# Patient Record
Sex: MALE | URBAN - METROPOLITAN AREA
[De-identification: names, ages, dates, MRNs, and addresses within clinical notes are randomized per-mention and may not be internally consistent; named-entity substitution may affect disease eponyms.]

---

## 2022-09-19 ENCOUNTER — OFFICE VISIT (OUTPATIENT)
Dept: URBAN - METROPOLITAN AREA CLINIC 124 | Facility: CLINIC | Age: 27
End: 2022-09-19

## 2022-09-19 NOTE — HPI-TODAY'S VISIT:
The patient was referred by Dr. Jin Giles for eval of .   A copy of this document is being forwarded to the referring provider.

## 2024-06-05 ENCOUNTER — OFFICE VISIT (OUTPATIENT)
Dept: URBAN - METROPOLITAN AREA CLINIC 105 | Facility: CLINIC | Age: 29
End: 2024-06-05
Payer: COMMERCIAL

## 2024-06-05 VITALS
BODY MASS INDEX: 22.94 KG/M2 | WEIGHT: 160.2 LBS | HEART RATE: 70 BPM | TEMPERATURE: 96.6 F | DIASTOLIC BLOOD PRESSURE: 83 MMHG | HEIGHT: 70 IN | SYSTOLIC BLOOD PRESSURE: 132 MMHG

## 2024-06-05 DIAGNOSIS — F90.9 ADULT ADHD: ICD-10-CM

## 2024-06-05 DIAGNOSIS — F41.9 ANXIETY DISORDER, UNSPECIFIED TYPE: ICD-10-CM

## 2024-06-05 DIAGNOSIS — R14.0 ABDOMINAL BLOATING: ICD-10-CM

## 2024-06-05 DIAGNOSIS — R53.83 FATIGUE, UNSPECIFIED TYPE: ICD-10-CM

## 2024-06-05 DIAGNOSIS — T78.1XXA GASTROINTESTINAL FOOD SENSITIVITY: ICD-10-CM

## 2024-06-05 DIAGNOSIS — R19.7 DIARRHEA, UNSPECIFIED TYPE: ICD-10-CM

## 2024-06-05 DIAGNOSIS — Z86.69 HX OF MIGRAINES: ICD-10-CM

## 2024-06-05 DIAGNOSIS — R11.0 NAUSEA: ICD-10-CM

## 2024-06-05 DIAGNOSIS — Z87.19 HISTORY OF IRRITABLE BOWEL SYNDROME: ICD-10-CM

## 2024-06-05 DIAGNOSIS — K21.9 GASTROESOPHAGEAL REFLUX DISEASE, UNSPECIFIED WHETHER ESOPHAGITIS PRESENT: ICD-10-CM

## 2024-06-05 DIAGNOSIS — Z86.19 HISTORY OF CLOSTRIDIOIDES DIFFICILE COLITIS: ICD-10-CM

## 2024-06-05 DIAGNOSIS — K90.49 ALCOHOL INTOLERANCE: ICD-10-CM

## 2024-06-05 PROCEDURE — 99205 OFFICE O/P NEW HI 60 MIN: CPT | Performed by: INTERNAL MEDICINE

## 2024-06-05 RX ORDER — SUCRALFATE 1 G/10ML
10 ML 1 HOUR BEFORE MEALS AND AT BEDTIME ON AN EMPTY STOMACH SUSPENSION ORAL
Status: ACTIVE | COMMUNITY

## 2024-06-05 RX ORDER — FINASTERIDE 1 MG/1
1 TABLET TABLET, FILM COATED ORAL ONCE A DAY
Status: ACTIVE | COMMUNITY

## 2024-06-05 RX ORDER — PANTOPRAZOLE SODIUM 20 MG/1
1 TABLET TABLET, DELAYED RELEASE ORAL ONCE A DAY
Status: ACTIVE | COMMUNITY

## 2024-06-05 RX ORDER — CITALOPRAM 30 MG/1
1 CAPSULE CAPSULE ORAL ONCE A DAY
Status: ACTIVE | COMMUNITY

## 2024-06-05 RX ORDER — BACLOFEN 20 MG/1
1 TABLET ADMINISTER WITHOUT REGARDS TO MEALS AS NEEDED TABLET ORAL TWICE A DAY
Status: ACTIVE | COMMUNITY

## 2024-06-05 NOTE — HPI-TODAY'S VISIT:
Today, the patient says he was unhappy with his last GI, so he was seeking another opinion. He says he's been having "leaky gut" for a little under 2 years - almost daily watery diarrhea and mucus. He has 1-3 BMs/day with incomplete evac. He has a formed BM 1x/couple weeks. His watery BMs can start out with formed pieces then turn completely water. No blood or abdominal pain. He had C. diff in 2016. He has a lot of bloating and gas. He will sometimes feel bloated, go to have a BM then only mucus comes out. He also has migraines - now associated with dietary triggers, but prev there was no pattern with certain foods. Other symptoms are low blood pressure, "watery bloating," and fatigue. He says his number one symptom is fatigue. He only has nausea if he does not sleep well. He was "informally" dx'd with IBS 10 years ago. Never taken amitriptyline. He took Xifaxin - no benefit. He says Xifaxin caused him to have diarrhea, a "food sensitivity," and an "alcohol sensitivity."  Reported reflux symptoms are throat burning, nausea, and an "irregular lower esophageal sphincter." He had an EGD in college. He gets heartburn/regurgitaiton. He takes pantoprazole 20 mg 1.5 pills/day - 0.5 pill at breakfast, 1 pill at bedtime. Heartburn/regurgitation is less of the antacid, but not relieved. Throat burning is improved. He uses Carafate suspension TID. He's been on it since 2017.  Labs 11/15/23 - CMP normal except sodium 135, bilirubin 1.2. CBC normal. 3/16/23 - Stool study negative. Fecal elastase normal. 3/15/23 - CBC normal except platelets 139. CRP < 1. Celiac panel negative. CMP, CRP all normal. 9/11/22 - CBC normal except platelets 140. CMP all normal.

## 2024-06-06 ENCOUNTER — DASHBOARD ENCOUNTERS (OUTPATIENT)
Age: 29
End: 2024-06-06

## 2024-06-06 PROBLEM — 235595009: Status: ACTIVE | Noted: 2024-06-06

## 2024-06-06 PROBLEM — 102612005: Status: ACTIVE | Noted: 2024-06-06

## 2024-06-06 PROBLEM — 444613000: Status: ACTIVE | Noted: 2024-06-06

## 2024-06-06 PROBLEM — 197480006: Status: ACTIVE | Noted: 2024-06-06

## 2024-06-07 ENCOUNTER — WEB ENCOUNTER (OUTPATIENT)
Dept: URBAN - METROPOLITAN AREA CLINIC 105 | Facility: CLINIC | Age: 29
End: 2024-06-07

## 2024-07-26 ENCOUNTER — OFFICE VISIT (OUTPATIENT)
Dept: URBAN - METROPOLITAN AREA CLINIC 105 | Facility: CLINIC | Age: 29
End: 2024-07-26
Payer: COMMERCIAL

## 2024-07-26 ENCOUNTER — LAB OUTSIDE AN ENCOUNTER (OUTPATIENT)
Dept: URBAN - METROPOLITAN AREA CLINIC 105 | Facility: CLINIC | Age: 29
End: 2024-07-26

## 2024-07-26 VITALS
DIASTOLIC BLOOD PRESSURE: 84 MMHG | HEIGHT: 70 IN | BODY MASS INDEX: 23.42 KG/M2 | SYSTOLIC BLOOD PRESSURE: 139 MMHG | TEMPERATURE: 97 F | HEART RATE: 60 BPM | WEIGHT: 163.6 LBS

## 2024-07-26 DIAGNOSIS — K21.9 GASTROESOPHAGEAL REFLUX DISEASE, UNSPECIFIED WHETHER ESOPHAGITIS PRESENT: ICD-10-CM

## 2024-07-26 DIAGNOSIS — Z87.19 HISTORY OF IRRITABLE BOWEL SYNDROME: ICD-10-CM

## 2024-07-26 DIAGNOSIS — R11.0 NAUSEA: ICD-10-CM

## 2024-07-26 DIAGNOSIS — T78.1XXA GASTROINTESTINAL FOOD SENSITIVITY: ICD-10-CM

## 2024-07-26 DIAGNOSIS — K90.49 ALCOHOL INTOLERANCE: ICD-10-CM

## 2024-07-26 DIAGNOSIS — F90.9 ADULT ADHD: ICD-10-CM

## 2024-07-26 DIAGNOSIS — F41.9 ANXIETY DISORDER, UNSPECIFIED TYPE: ICD-10-CM

## 2024-07-26 DIAGNOSIS — R07.0 THROAT BURNING: ICD-10-CM

## 2024-07-26 DIAGNOSIS — R19.7 DIARRHEA, UNSPECIFIED TYPE: ICD-10-CM

## 2024-07-26 DIAGNOSIS — R53.83 FATIGUE, UNSPECIFIED TYPE: ICD-10-CM

## 2024-07-26 DIAGNOSIS — Z86.69 HX OF MIGRAINES: ICD-10-CM

## 2024-07-26 DIAGNOSIS — K44.9 HIATAL HERNIA: ICD-10-CM

## 2024-07-26 DIAGNOSIS — Z86.19 HISTORY OF CLOSTRIDIOIDES DIFFICILE COLITIS: ICD-10-CM

## 2024-07-26 DIAGNOSIS — R14.0 ABDOMINAL BLOATING: ICD-10-CM

## 2024-07-26 PROCEDURE — 99213 OFFICE O/P EST LOW 20 MIN: CPT | Performed by: INTERNAL MEDICINE

## 2024-07-26 RX ORDER — FINASTERIDE 1 MG/1
1 TABLET TABLET, FILM COATED ORAL ONCE A DAY
Status: ACTIVE | COMMUNITY

## 2024-07-26 RX ORDER — BACLOFEN 20 MG/1
1 TABLET ADMINISTER WITHOUT REGARDS TO MEALS AS NEEDED TABLET ORAL TWICE A DAY
Status: ACTIVE | COMMUNITY

## 2024-07-26 RX ORDER — CITALOPRAM 30 MG/1
1 CAPSULE CAPSULE ORAL ONCE A DAY
Status: ACTIVE | COMMUNITY

## 2024-07-26 RX ORDER — SUCRALFATE 1 G/10ML
10 ML 1 HOUR BEFORE MEALS AND AT BEDTIME ON AN EMPTY STOMACH SUSPENSION ORAL
Status: ACTIVE | COMMUNITY

## 2024-07-26 RX ORDER — PANTOPRAZOLE SODIUM 20 MG/1
1 TABLET TABLET, DELAYED RELEASE ORAL ONCE A DAY
Status: ACTIVE | COMMUNITY

## 2024-07-26 NOTE — HPI-TODAY'S VISIT:
On 6/5/24, the patient said he was unhappy with his last GI, so he was seeking another opinion. He said he had been having "leaky gut" for a little under 2 years - almost daily watery diarrhea and mucus. He had 1-3 BMs/day with incomplete evac. He had a formed BM 1x/couple weeks. His watery BMs could start out with formed pieces then turn completely water. No blood or abdominal pain. He had C. diff in 2016. He had a lot of bloating and gas. He would sometimes feel bloated, go to have a BM then only mucus came out. He also had migraines - now associated with dietary triggers, but prev there was no pattern with certain foods. Other symptoms were low blood pressure, "watery bloating," and fatigue. He said his number one symptom was fatigue. He only had nausea if he did not sleep well. He was "informally" dx'd with IBS 10 years ago. Never taken amitriptyline. He took Xifaxin - no benefit. He said Xifaxin caused him to have diarrhea, a "food sensitivity," and an "alcohol sensitivity."  Reported reflux symptoms were throat burning, nausea, and an "irregular lower esophageal sphincter." He had an EGD in college. He got heartburn/regurgitaiton. He took pantoprazole 20 mg 1.5 pills/day - 0.5 pill at breakfast, 1 pill at bedtime. Heartburn/regurgitation was less of the antacid, but not relieved. Throat burning was improved. He used Carafate suspension TID. He had been on it since 2017.  HPI: Today, he says he is hesitant to start on amitriptyline because mirtazepine did "bad things" to him and "withdrawl" was not a good experience for him. Bowel habit is the same. He's done stool H. pylori and stool Candida testing at the [facility] - negative. He continues on sucralfate TID, Baclofen BID, and pantoprazole 20 mg 0.5 pill QAM. He has noted a significant difference w/ sucralfate. EGD done about a decade ago noted HH. He feels like he has more "reflux" symptoms that he associated with the HH - burning in the back of throat, nausea, "hot flashes" with certain foods/seasonings like black pepper. He can wake up in the middle of the night and morning with a "hot" feeling in throat and nausea. He denies heartburn. He notes these worsening symptoms started having an acute illness w/ nausea/vomiting. He takes famotidine PRN. 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  Labs 11/15/23 - CMP normal except sodium 135, bilirubin 1.2. CBC normal. 3/16/23 - Stool study negative. Fecal elastase normal. 3/15/23 - CBC normal except platelets 139. CRP < 1. Celiac panel negative. CMP, CRP all normal. 9/11/22 - CBC normal except platelets 140. CMP all normal.

## 2024-07-28 ENCOUNTER — WEB ENCOUNTER (OUTPATIENT)
Dept: URBAN - METROPOLITAN AREA CLINIC 105 | Facility: CLINIC | Age: 29
End: 2024-07-28

## 2024-07-29 ENCOUNTER — LAB OUTSIDE AN ENCOUNTER (OUTPATIENT)
Dept: URBAN - METROPOLITAN AREA CLINIC 105 | Facility: CLINIC | Age: 29
End: 2024-07-29

## 2024-12-05 ENCOUNTER — OFFICE VISIT (OUTPATIENT)
Dept: URBAN - METROPOLITAN AREA CLINIC 105 | Facility: CLINIC | Age: 29
End: 2024-12-05

## 2024-12-05 RX ORDER — BACLOFEN 20 MG/1
1 TABLET ADMINISTER WITHOUT REGARDS TO MEALS AS NEEDED TABLET ORAL TWICE A DAY
COMMUNITY

## 2024-12-05 RX ORDER — PANTOPRAZOLE SODIUM 20 MG/1
1 TABLET TABLET, DELAYED RELEASE ORAL ONCE A DAY
COMMUNITY

## 2024-12-05 RX ORDER — FINASTERIDE 1 MG/1
1 TABLET TABLET, FILM COATED ORAL ONCE A DAY
COMMUNITY

## 2024-12-05 RX ORDER — CITALOPRAM 30 MG/1
1 CAPSULE CAPSULE ORAL ONCE A DAY
COMMUNITY

## 2024-12-05 RX ORDER — SUCRALFATE 1 G/10ML
10 ML 1 HOUR BEFORE MEALS AND AT BEDTIME ON AN EMPTY STOMACH SUSPENSION ORAL
COMMUNITY

## 2024-12-12 ENCOUNTER — OFFICE VISIT (OUTPATIENT)
Dept: URBAN - METROPOLITAN AREA CLINIC 105 | Facility: CLINIC | Age: 29
End: 2024-12-12
Payer: COMMERCIAL

## 2024-12-12 VITALS
SYSTOLIC BLOOD PRESSURE: 122 MMHG | DIASTOLIC BLOOD PRESSURE: 73 MMHG | WEIGHT: 160 LBS | HEIGHT: 70 IN | BODY MASS INDEX: 22.9 KG/M2 | HEART RATE: 59 BPM | TEMPERATURE: 97.3 F

## 2024-12-12 DIAGNOSIS — R14.0 ABDOMINAL BLOATING: ICD-10-CM

## 2024-12-12 DIAGNOSIS — Z86.69 HX OF MIGRAINES: ICD-10-CM

## 2024-12-12 DIAGNOSIS — R07.0 THROAT BURNING: ICD-10-CM

## 2024-12-12 DIAGNOSIS — R11.0 NAUSEA: ICD-10-CM

## 2024-12-12 DIAGNOSIS — F90.9 ADULT ADHD: ICD-10-CM

## 2024-12-12 DIAGNOSIS — T78.1XXA GASTROINTESTINAL FOOD SENSITIVITY: ICD-10-CM

## 2024-12-12 DIAGNOSIS — Z86.19 HISTORY OF CLOSTRIDIOIDES DIFFICILE COLITIS: ICD-10-CM

## 2024-12-12 DIAGNOSIS — F41.9 ANXIETY DISORDER, UNSPECIFIED TYPE: ICD-10-CM

## 2024-12-12 DIAGNOSIS — K21.9 GASTROESOPHAGEAL REFLUX DISEASE, UNSPECIFIED WHETHER ESOPHAGITIS PRESENT: ICD-10-CM

## 2024-12-12 DIAGNOSIS — Z87.19 HISTORY OF IRRITABLE BOWEL SYNDROME: ICD-10-CM

## 2024-12-12 DIAGNOSIS — R19.7 DIARRHEA, UNSPECIFIED TYPE: ICD-10-CM

## 2024-12-12 DIAGNOSIS — R53.83 FATIGUE, UNSPECIFIED TYPE: ICD-10-CM

## 2024-12-12 DIAGNOSIS — K90.49 ALCOHOL INTOLERANCE: ICD-10-CM

## 2024-12-12 DIAGNOSIS — K44.9 HIATAL HERNIA: ICD-10-CM

## 2024-12-12 PROCEDURE — 99214 OFFICE O/P EST MOD 30 MIN: CPT | Performed by: INTERNAL MEDICINE

## 2024-12-12 RX ORDER — FINASTERIDE 1 MG/1
1 TABLET TABLET, FILM COATED ORAL ONCE A DAY
Status: ACTIVE | COMMUNITY

## 2024-12-12 RX ORDER — CITALOPRAM 30 MG/1
1 CAPSULE CAPSULE ORAL ONCE A DAY
Status: ACTIVE | COMMUNITY

## 2024-12-12 RX ORDER — BACLOFEN 20 MG/1
1 TABLET ADMINISTER WITHOUT REGARDS TO MEALS AS NEEDED TABLET ORAL TWICE A DAY
Status: ACTIVE | COMMUNITY

## 2024-12-12 RX ORDER — SUCRALFATE 1 G/10ML
10 ML 1 HOUR BEFORE MEALS AND AT BEDTIME ON AN EMPTY STOMACH SUSPENSION ORAL
Status: ACTIVE | COMMUNITY

## 2024-12-12 RX ORDER — PANTOPRAZOLE SODIUM 20 MG/1
1 TABLET TABLET, DELAYED RELEASE ORAL ONCE A DAY
Status: ACTIVE | COMMUNITY

## 2024-12-12 NOTE — HPI-TODAY'S VISIT:
On 6/5/24, the patient said he was unhappy with his last GI, so he was seeking another opinion. He said he had been having "leaky gut" for a little under 2 years - almost daily watery diarrhea and mucus. He had 1-3 BMs/day with incomplete evac. He had a formed BM 1x/couple weeks. His watery BMs could start out with formed pieces then turn completely water. No blood or abdominal pain. He had C. diff in 2016. He had a lot of bloating and gas. He would sometimes feel bloated, go to have a BM then only mucus came out. He also had migraines - now associated with dietary triggers, but prev there was no pattern with certain foods. Other symptoms were low blood pressure, "watery bloating," and fatigue. He said his number one symptom was fatigue. He only had nausea if he did not sleep well. He was "informally" dx'd with IBS 10 years ago. Never taken amitriptyline. He took Xifaxin - no benefit. He said Xifaxin caused him to have diarrhea, a "food sensitivity," and an "alcohol sensitivity."  Reported reflux symptoms were throat burning, nausea, and an "irregular lower esophageal sphincter." He had an EGD in college. He got heartburn/regurgitaiton. He took pantoprazole 20 mg 1.5 pills/day - 0.5 pill at breakfast, 1 pill at bedtime. Heartburn/regurgitation was less of the antacid, but not relieved. Throat burning was improved. He used Carafate suspension TID. He had been on it since 2017.  On 7/26/24, he said he was hesitant to start on amitriptyline because mirtazepine did "bad things" to him and "withdrawal" was not a good experience for him. Bowel habit was the same. He'd done stool H. pylori and stool Candida testing at the [facility] - negative. He continued on sucralfate TID, Baclofen BID, and pantoprazole 20 mg 0.5 pill QAM. He noted a significant difference w/ sucralfate. EGD done about a decade ago noted HH. He felt like he had more "reflux" symptoms that he associated with the HH - burning in the back of throat, nausea, "hot flashes" with certain foods/seasonings like black pepper. He could wake up in the middle of the night and morning with a "hot" feeling in throat and nausea. He denied heartburn. He noted these worsening symptoms started having an acute illness w/ nausea/vomiting. He took famotidine PRN. 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  HPI: Today, pt presents for f/u. At last visit, obtaining EGD with Bravo pH study, obtaining GES, and trial of higher dose of acid suppression discussed. Patient was to consider these options and decide if he wanted to proceed. He messaged indicating that he wanted to schedule an EGD but preferred to do barium swallow or some imaging procedure prior to this. Dr. Davis ordered upper GI series with barium tablet, which showed mild thickening of gastric folds, evidence of reflux; structurally normal esophagus and stomach without evidence of significant hiatal hernia. Today, pt states he has been having worsening GI issues since 7/2024. States he had H pylori stool test some time ago which was negative. States in the years prior to worsening symptoms, he had a reaction to mold that messed with his intestines; he feels that this has resolved but is unsure if this is contributing to his issues. States he has been on PPIs for about a decade.  He states it doesn't feel like previous issues with acid since he has been on the PPI. Feels more like nausea, gassiness, "hot flashes." He is taking 1.5 pills of pantoprazole 20 mg daily. He has taken Nexium in the past and states it lost its efficacy, but felt different than this time. No allergy to nickel.   Labs 11/15/23 - CMP normal except sodium 135, bilirubin 1.2. CBC normal. 3/16/23 - Stool study negative. Fecal elastase normal. 3/15/23 - CBC normal except platelets 139. CRP < 1. Celiac panel negative. CMP, CRP all normal. 9/11/22 - CBC normal except platelets 140. CMP all normal.

## 2024-12-13 ENCOUNTER — TELEPHONE ENCOUNTER (OUTPATIENT)
Dept: URBAN - METROPOLITAN AREA CLINIC 105 | Facility: CLINIC | Age: 29
End: 2024-12-13

## 2024-12-23 ENCOUNTER — OFFICE VISIT (OUTPATIENT)
Dept: URBAN - METROPOLITAN AREA MEDICAL CENTER 33 | Facility: MEDICAL CENTER | Age: 29
End: 2024-12-23

## 2025-01-13 ENCOUNTER — WEB ENCOUNTER (OUTPATIENT)
Dept: URBAN - METROPOLITAN AREA CLINIC 105 | Facility: CLINIC | Age: 30
End: 2025-01-13

## 2025-01-14 ENCOUNTER — OFFICE VISIT (OUTPATIENT)
Dept: URBAN - METROPOLITAN AREA CLINIC 105 | Facility: CLINIC | Age: 30
End: 2025-01-14

## 2025-02-07 ENCOUNTER — OFFICE VISIT (OUTPATIENT)
Dept: URBAN - METROPOLITAN AREA CLINIC 105 | Facility: CLINIC | Age: 30
End: 2025-02-07
Payer: COMMERCIAL

## 2025-02-07 VITALS
HEART RATE: 75 BPM | WEIGHT: 163 LBS | BODY MASS INDEX: 23.34 KG/M2 | SYSTOLIC BLOOD PRESSURE: 114 MMHG | TEMPERATURE: 97.9 F | DIASTOLIC BLOOD PRESSURE: 79 MMHG | HEIGHT: 70 IN

## 2025-02-07 DIAGNOSIS — K21.9 GASTROESOPHAGEAL REFLUX DISEASE, UNSPECIFIED WHETHER ESOPHAGITIS PRESENT: ICD-10-CM

## 2025-02-07 DIAGNOSIS — R53.83 FATIGUE, UNSPECIFIED TYPE: ICD-10-CM

## 2025-02-07 DIAGNOSIS — T78.1XXA GASTROINTESTINAL FOOD SENSITIVITY: ICD-10-CM

## 2025-02-07 DIAGNOSIS — R11.0 NAUSEA: ICD-10-CM

## 2025-02-07 DIAGNOSIS — F41.9 ANXIETY DISORDER, UNSPECIFIED TYPE: ICD-10-CM

## 2025-02-07 DIAGNOSIS — R14.0 ABDOMINAL BLOATING: ICD-10-CM

## 2025-02-07 DIAGNOSIS — Z86.19 HISTORY OF CLOSTRIDIOIDES DIFFICILE COLITIS: ICD-10-CM

## 2025-02-07 DIAGNOSIS — Z86.69 HX OF MIGRAINES: ICD-10-CM

## 2025-02-07 DIAGNOSIS — K59.00 CONSTIPATION, UNSPECIFIED CONSTIPATION TYPE: ICD-10-CM

## 2025-02-07 DIAGNOSIS — K90.49 ALCOHOL INTOLERANCE: ICD-10-CM

## 2025-02-07 DIAGNOSIS — K44.9 HIATAL HERNIA: ICD-10-CM

## 2025-02-07 DIAGNOSIS — Z87.19 HISTORY OF IRRITABLE BOWEL SYNDROME: ICD-10-CM

## 2025-02-07 DIAGNOSIS — R07.0 THROAT BURNING: ICD-10-CM

## 2025-02-07 DIAGNOSIS — F90.9 ADULT ADHD: ICD-10-CM

## 2025-02-07 PROBLEM — 14760008: Status: ACTIVE | Noted: 2025-02-07

## 2025-02-07 PROCEDURE — 99213 OFFICE O/P EST LOW 20 MIN: CPT | Performed by: INTERNAL MEDICINE

## 2025-02-07 RX ORDER — BACLOFEN 20 MG/1
1 TABLET ADMINISTER WITHOUT REGARDS TO MEALS AS NEEDED TABLET ORAL TWICE A DAY
Status: ACTIVE | COMMUNITY

## 2025-02-07 RX ORDER — SUCRALFATE 1 G/10ML
10 ML 1 HOUR BEFORE MEALS AND AT BEDTIME ON AN EMPTY STOMACH SUSPENSION ORAL
Status: ACTIVE | COMMUNITY

## 2025-02-07 RX ORDER — PANTOPRAZOLE SODIUM 20 MG/1
1 TABLET TABLET, DELAYED RELEASE ORAL ONCE A DAY
Status: ACTIVE | COMMUNITY

## 2025-02-07 RX ORDER — FINASTERIDE 1 MG/1
1 TABLET TABLET, FILM COATED ORAL ONCE A DAY
Status: ACTIVE | COMMUNITY

## 2025-02-07 RX ORDER — CITALOPRAM 30 MG/1
1 CAPSULE CAPSULE ORAL ONCE A DAY
Status: ACTIVE | COMMUNITY

## 2025-02-07 NOTE — HPI-TODAY'S VISIT:
On 6/5/24, the patient said he was unhappy with his last GI, so he was seeking another opinion. He said he had been having "leaky gut" for a little under 2 years - almost daily watery diarrhea and mucus. He had 1-3 BMs/day with incomplete evac. He had a formed BM 1x/couple weeks. His watery BMs could start out with formed pieces then turn completely water. No blood or abdominal pain. He had C. diff in 2016. He had a lot of bloating and gas. He would sometimes feel bloated, go to have a BM then only mucus came out. He also had migraines - now associated with dietary triggers, but prev there was no pattern with certain foods. Other symptoms were low blood pressure, "watery bloating," and fatigue. He said his number one symptom was fatigue. He only had nausea if he did not sleep well. He was "informally" dx'd with IBS 10 years ago. Never taken amitriptyline. He took Xifaxin - no benefit. He said Xifaxin caused him to have diarrhea, a "food sensitivity," and an "alcohol sensitivity."  Reported reflux symptoms were throat burning, nausea, and an "irregular lower esophageal sphincter." He had an EGD in college. He got heartburn/regurgitaiton. He took pantoprazole 20 mg 1.5 pills/day - 0.5 pill at breakfast, 1 pill at bedtime. Heartburn/regurgitation was less of the antacid, but not relieved. Throat burning was improved. He used Carafate suspension TID. He had been on it since 2017.  On 7/26/24, he said he was hesitant to start on amitriptyline because mirtazepine did "bad things" to him and "withdrawal" was not a good experience for him. Bowel habit was the same. He'd done stool H. pylori and stool Candida testing at the [facility] - negative. He continued on sucralfate TID, Baclofen BID, and pantoprazole 20 mg 0.5 pill QAM. He noted a significant difference w/ sucralfate. EGD done about a decade ago noted HH. He felt like he had more "reflux" symptoms that he associated with the HH - burning in the back of throat, nausea, "hot flashes" with certain foods/seasonings like black pepper. He could wake up in the middle of the night and morning with a "hot" feeling in throat and nausea. He denied heartburn. He noted these worsening symptoms started having an acute illness w/ nausea/vomiting. He took famotidine PRN. 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  On 12/12/24, pt presented for f/u. At last visit, obtaining EGD with Bravo pH study, obtaining GES, and trial of higher dose of acid suppression discussed. Patient was to consider these options and decide if he wanted to proceed. He messaged indicating that he wanted to schedule an EGD but preferred to do barium swallow or some imaging procedure prior to this. Dr. Davis ordered upper GI series with barium tablet, which showed mild thickening of gastric folds, evidence of reflux; structurally normal esophagus and stomach without evidence of significant hiatal hernia. Today, pt stated he had been having worsening GI issues since 7/2024. Stated he had H pylori stool test some time ago which was negative. Stated in the years prior to worsening symptoms, he had a reaction to mold that messed with his intestines; he felt that this had resolved but was unsure if this was contributing to his issues. Stated he had been on PPIs for about a decade.  He stated it didn't feel like previous issues with acid since he had been on the PPI. Felt more like nausea, gassiness, "hot flashes." He was taking 1.5 pills of pantoprazole 20 mg daily. He had taken Nexium in the past and stated it lost its efficacy, but felt different than this time. No allergy to nickel.  HPI: Today, pt presents after EGD w/Bravo pH study. He was recommended to do this off medicines (PPI, baclofen, carafate), but stated he would not be able to do this, so study was done on medicines. pH study capsule failed to capture data and pt opted to take a "credit" for the study, rather than repeat. Pt states he tried increasing pantoprazole to 20 mg twice daily but didn't seem to make a difference. He doesn't think that the issue is related to acid reflux at this time. States he has been trying to stop certain supplements or changing meds. He is currently focusing on trying to identify if there is a certain food intolerance. 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  Symptoms are a "kind of nausea" that he has a hard time describing. Never had vomiting, just unpleasant sensation. Lots of burping. Usually worse when laying flat or on empty stomach.  He admits to incomplete emptying in terms of BMs. States he has an "absurdly high fiber diet" and drinks plenty of water. He doesn't think this is likely contributing, but he is open to trying MiraLax.    Labs 11/15/23 - CMP normal except sodium 135, bilirubin 1.2. CBC normal. 3/16/23 - Stool study negative. Fecal elastase normal. 3/15/23 - CBC normal except platelets 139. CRP < 1. Celiac panel negative. CMP, CRP all normal. 9/11/22 - CBC normal except platelets 140. CMP all normal.